# Patient Record
Sex: FEMALE | Race: WHITE
[De-identification: names, ages, dates, MRNs, and addresses within clinical notes are randomized per-mention and may not be internally consistent; named-entity substitution may affect disease eponyms.]

---

## 2019-04-02 ENCOUNTER — HOSPITAL ENCOUNTER (EMERGENCY)
Dept: HOSPITAL 80 - FED | Age: 24
Discharge: HOME | End: 2019-04-02
Payer: COMMERCIAL

## 2019-04-02 VITALS — SYSTOLIC BLOOD PRESSURE: 117 MMHG | DIASTOLIC BLOOD PRESSURE: 72 MMHG

## 2019-04-02 DIAGNOSIS — E86.9: ICD-10-CM

## 2019-04-02 DIAGNOSIS — K59.00: ICD-10-CM

## 2019-04-02 DIAGNOSIS — R10.31: Primary | ICD-10-CM

## 2019-04-02 NOTE — EDPHY
H & P


Stated Complaint: RLQ abd pain x 48hrs


Time Seen by Provider: 04/02/19 18:29


HPI/ROS: 





CHIEF COMPLAINT:  Right upper quadrant pain





HISTORY OF PRESENT ILLNESS:  The patient is a 23-year-old female who comes to 

the emergency department complaining of right upper quadrant pain for the last 

48 hr.  She states that it is intermittent and seems to be worse when she eats 

or walks.  It does not radiate.  She presented to [facility] today where they 

perform a CBC and chemistry.  Her white count was 7 and her electrolytes are 

normal.  Her creatinine 0.7.  Pregnancy test is negative.  They also performed 

a negative Monospot and strep test because she has exudative tonsils and had 

sore throat this morning before gargling with salt water.  It has since 

resolved.  She has not had a fever.  She has had mild nausea but no vomiting.  

No diarrhea.  Rest of her lab work including LFTs and UA were negative.  They 

referred her here for rule out appendicitis.


Severity:  Moderate


Modifying factors:  Worsened by eating and walking





REVIEW OF SYSTEMS:


Constitutional:  denies: chills, fever, recent illness, recent injury


EENTM: denies: blurred vision, double vision, nose congestion


Respiratory: denies: cough, shortness of breath


Cardiac: denies: chest pain, irregular heart rate, lightheadedness, palpitations


Gastrointestinal/Abdominal:  See HPI


Genitourinary: denies: dysuria, frequency, hematuria, pain


Musculoskeletal: denies: joint pain, muscle pain


Skin: denies: lesions, rash, jaundice, bruising


Neurological: denies: headache, numbness, paresthesia, tingling, dizziness, 

weakness


Hematologic/Lymphatic: denies: blood clots, easy bleeding, easy bruising


Immunologic/allergic: denies: HIV/AIDS, transplant


 10 systems reviewed and negative except as noted





EXAM:


GENERAL:  Well-appearing, well-nourished and in no acute distress.


HEAD:  Atraumatic, normocephalic.


EYES:  Pupils equal round and reactive to light, extraocular movements intact, 

sclera anicteric, conjunctiva are normal.


ENT:  TMs normal, nares patent, oropharynx clear without exudates.  Moist 

mucous membranes.


NECK:  Normal range of motion, supple without lymphadenopathy or JVD.


LUNGS:  Breath sounds clear to auscultation bilaterally and equal.  No wheezes 

rales or rhonchi.


HEART:  Regular rate and rhythm without murmurs, rubs or gallops.


ABDOMEN:  Right upper quadrant/right rib pain.  Tenderness to palpation, 

negative Bermudez sign.  No lower abdominal tenderness.  No suprapubic tenderness


BACK:  No CVA tenderness, no spinal tenderness, step-offs or deformities


EXTREMITIES:  Normal range of motion, no pitting or edema.  No clubbing or 

cyanosis.


NEUROLOGICAL:  Cranial nerves II through XII grossly intact.  Normal speech, 

normal gait.  5/5 strength, normal movement in all extremities, normal sensation

, normal reflexes


PSYCH:  Normal mood, normal affect.


SKIN:  Warm, dry, normal turgor, no visible rashes or lesions.








Source: Patient


Exam Limitations: No limitations





- Personal History


LMP (Females 10-55): 15-21 Days Ago


Current Tetanus Diphtheria and Acellular Pertussis (TDAP): Yes





- Medical/Surgical History


Hx Asthma: No


Hx Chronic Respiratory Disease: No


Hx Diabetes: No


Hx Cardiac Disease: No


Hx Renal Disease: No


Hx Cirrhosis: No


Hx Alcoholism: No


Hx HIV/AIDS: No


Hx Splenectomy or Spleen Trauma: No


Other PMH: DVT's





- Family History


Significant Family History: No pertinent family hx





- Social History


Smoking Status: Never smoked


Alcohol Use: None


Constitutional: 


 Initial Vital Signs











Temperature (C)  37.3 C   04/02/19 18:04


 


Heart Rate  84   04/02/19 18:04


 


Respiratory Rate  16   04/02/19 18:04


 


Blood Pressure  114/72   04/02/19 18:04


 


O2 Sat (%)  97   04/02/19 18:04








 











O2 Delivery Mode               Room Air














Allergies/Adverse Reactions: 


 





Penicillins Allergy (Verified 04/02/19 18:06)


 








Home Medications: 














 Medication  Instructions  Recorded


 


NK [No Known Home Meds]  04/02/19














Medical Decision Making





- Diagnostics


Imaging Results: 


 Imaging Impressions





Abdomen Ultrasound  04/02/19 18:33


Impression: Essentially negative right upper quadrant ultrasound with no 

evidence for cholelithiasis.


 


Results called and discussed with ROB URIOSTEGUI M.D. on 4/2/2019 at 19:30.  


 


 








Abdomen CT  04/02/19 19:38


Impression:


1. Query constipation.


2. A normal appendix is visualized.


3. See above report for additional findings.


 


Results called and discussed with ROB URIOSTEGUI M.D. on 4/2/2019 at 20:51.


 


 











Imaging: Discussed imaging studies w/ On call Radiologist


ED Course/Re-evaluation: 





Patient was sent here to rule out appendicitis.  I have very little concern for 

appendicitis based on her lab work and right upper quadrant pain.  She has no 

right lower quadrant pain.  She has a benign abdominal exam.  Labs have all 

been performed earlier today.  Will obtain ultrasound to evaluate her 

gallbladder and liver.





7:40 p.m. we discussed the patient's ultrasound which is reassuring.  She is 

now however complaining more of pain in her right lower quadrant.  She states 

that the pain is improved significantly after Toradol.  Will obtain CT scanning.





8:55 p.m. We discussed the CT scans.  She is reassured.  We discussed treatment 

for constipation.  Her lab work is all reassuring.  Her abdominal exam is 

benign.  Discussed indications for returning.


Differential Diagnosis: 





Partial list of the Differential diagnosis considered include but were not 

limited to;  biliary disease, constipation, appendicitis, kidney stone and 

although unlikely based on the history and physical exam, I also considered 

urinary tract infection, pregnancy, cyst, torsion.  I discussed these 

differential diagnoses and the plan with the patient as well as the usual and 

expected course.  The patient understands that the diagnosis is provisional and 

that in medicine we are not always correct and that further workup is often 

warranted.  Usual and customary warnings were given.  All of the patient's 

questions were answered.  The patient was instructed to return to the emergency 

department should the symptoms at all worsen or return, otherwise to followup 

with the physician as we discussed.





- Data Points


Medications Given: 


 








Discontinued Medications





Sodium Chloride (Ns)  1,000 mls @ 0 mls/hr IV EDNOW ONE; Wide Open


   PRN Reason: Protocol


   Stop: 04/02/19 18:34


   Last Admin: 04/02/19 18:52 Dose:  1,000 mls


Ketorolac Tromethamine (Toradol)  30 mg IVP EDNOW ONE


   Stop: 04/02/19 18:34


   Last Admin: 04/02/19 18:52 Dose:  30 mg





Point of Care Test Results: 


 Chemistry











  04/02/19





  18:29


 


POC Sodium  140 mEq/L mEq/L





   (135-145) 


 


POC Potassium  3.7 mEq/L mEq/L





   (3.3-5.0) 


 


POC Chloride  102 mEq/L mEq/L





   () 


 


POC Total CO2  23 mEq/L mEq/L





   (22-31) 


 


POC BUN  9 mg/dL mg/dL





   (7-23) 


 


POC Creatinine  0.6 mg/dL mg/dL





   (0.6-1.0) 


 


POC Glucose  91 mg/dL mg/dL





   () 








 ISTAT H&H











  04/02/19





  18:29


 


POC Hgb  14.6 gm/dL gm/dL





   (12.6-16.3) 


 


POC Hct  43 % %





   (38-47) 














Departure





- Departure


Disposition: Home, Routine, Self-Care


Clinical Impression: 


Abdominal pain


Qualifiers:


 Abdominal location: generalized Qualified Code(s): R10.84 - Generalized 

abdominal pain





Constipation


Qualifiers:


 Constipation type: unspecified constipation type Qualified Code(s): K59.00 - 

Constipation, unspecified





Condition: Fair


Instructions:  Constipation (ED), Acute Abdominal Pain (ED)


Referrals: 


[facility]HEVERRIA [Other] - 1 day, if not improved